# Patient Record
Sex: FEMALE | Race: WHITE
[De-identification: names, ages, dates, MRNs, and addresses within clinical notes are randomized per-mention and may not be internally consistent; named-entity substitution may affect disease eponyms.]

---

## 2019-05-30 PROBLEM — Z00.00 ENCOUNTER FOR PREVENTIVE HEALTH EXAMINATION: Status: ACTIVE | Noted: 2019-05-30

## 2019-06-03 ENCOUNTER — APPOINTMENT (OUTPATIENT)
Dept: FAMILY MEDICINE | Facility: CLINIC | Age: 39
End: 2019-06-03
Payer: COMMERCIAL

## 2019-06-03 VITALS
BODY MASS INDEX: 24.25 KG/M2 | WEIGHT: 160 LBS | OXYGEN SATURATION: 98 % | HEART RATE: 90 BPM | TEMPERATURE: 98.1 F | DIASTOLIC BLOOD PRESSURE: 84 MMHG | SYSTOLIC BLOOD PRESSURE: 116 MMHG | HEIGHT: 68 IN | RESPIRATION RATE: 16 BRPM

## 2019-06-03 DIAGNOSIS — F41.9 ANXIETY DISORDER, UNSPECIFIED: ICD-10-CM

## 2019-06-03 DIAGNOSIS — F32.9 ANXIETY DISORDER, UNSPECIFIED: ICD-10-CM

## 2019-06-03 DIAGNOSIS — R68.89 OTHER GENERAL SYMPTOMS AND SIGNS: ICD-10-CM

## 2019-06-03 DIAGNOSIS — Z83.3 FAMILY HISTORY OF DIABETES MELLITUS: ICD-10-CM

## 2019-06-03 DIAGNOSIS — F51.01 PRIMARY INSOMNIA: ICD-10-CM

## 2019-06-03 PROCEDURE — 99386 PREV VISIT NEW AGE 40-64: CPT

## 2019-06-03 RX ORDER — ETONOGESTREL AND ETHINYL ESTRADIOL .12; .015 MG/D; MG/D
0.12-0.015 INSERT, EXTENDED RELEASE VAGINAL
Qty: 1 | Refills: 2 | Status: ACTIVE | COMMUNITY
Start: 2019-06-03 | End: 1900-01-01

## 2019-06-03 RX ORDER — SERTRALINE HYDROCHLORIDE 100 MG/1
100 TABLET, FILM COATED ORAL DAILY
Qty: 45 | Refills: 3 | Status: ACTIVE | COMMUNITY
Start: 2019-06-03 | End: 1900-01-01

## 2019-06-03 RX ORDER — TRAZODONE HYDROCHLORIDE 50 MG/1
50 TABLET ORAL
Qty: 15 | Refills: 3 | Status: ACTIVE | COMMUNITY
Start: 2019-06-03 | End: 1900-01-01

## 2019-06-03 NOTE — HEALTH RISK ASSESSMENT
[Patient reported mammogram was normal] : Patient reported mammogram was normal [Patient reported PAP Smear was normal] : Patient reported PAP Smear was normal [None] : None [] : No [de-identified] : 3-4 x per week [de-identified] : Denies any drug use [MammogramDate] : 2018 [PapSmearDate] : 2018

## 2019-06-03 NOTE — HISTORY OF PRESENT ILLNESS
[de-identified] : 37 YO PMHx depression/anxiety here to establish care. Recently moved from [place] for work. No longer has any doctors. Has no current issues. Needs prescription for zoloft; has been off since January. Needs nuvaring. Needs trazodone as well. Also is looking to establish care with an OB/GYN in the area. \par Patient reports taking the following medications: Trazodone 25 QD , Phentermine 37.5 QD, zoloft 150 BID, Ativan 0.5 PRN, Nuvaring, Zyrtec

## 2019-06-03 NOTE — PLAN
[FreeTextEntry1] : 37YO F PMHx anxiety and depression presents as new patient to establish care\par \par Depression\par - Patient reports taking sertraline 150 BID; has not taken it since January-Feb?; reports having some pills\par - Lengthy discussion had with patient about the high dose of sertraline, recommended starting at a lower dose and titrating up if needed\par - Will restart at sertraline 150 QD\par - Prescription provided for trazodone 25\par \par Weight\par - Patient with normal BMI\par - No indication for phentermine as can worsen anxiety/depression and patient is a normal weight\par - Recommend diet and exercise; counselled on lifestyle modification \par - Can follow up with weight loss clinic \par \par Contraception\par - Prescription provided for NuvaRing x 3 months\par - Counselled on safe sex practices/STD prevention\par \par HCM\par - recommended bloodwork, patient refused at this time and will have recent bloodwork sent to office\par - Patient to provide medical documentation from previous PCP in [place] ; release of information signed\par - Follow up in

## 2019-06-03 NOTE — PHYSICAL EXAM
[No Acute Distress] : no acute distress [Well Nourished] : well nourished [Well Developed] : well developed [Well-Appearing] : well-appearing [Normal Sclera/Conjunctiva] : normal sclera/conjunctiva [PERRL] : pupils equal round and reactive to light [Normal Oropharynx] : the oropharynx was normal [EOMI] : extraocular movements intact [No JVD] : no jugular venous distention [No Respiratory Distress] : no respiratory distress  [Clear to Auscultation] : lungs were clear to auscultation bilaterally [Normal Rate] : normal rate  [No Accessory Muscle Use] : no accessory muscle use [Regular Rhythm] : with a regular rhythm [No Edema] : there was no peripheral edema [Normal S1, S2] : normal S1 and S2 [Soft] : abdomen soft [No Extremity Clubbing/Cyanosis] : no extremity clubbing/cyanosis [Non Tender] : non-tender [No Joint Swelling] : no joint swelling [No Rash] : no rash [Normal Gait] : normal gait [Coordination Grossly Intact] : coordination grossly intact

## 2019-09-27 ENCOUNTER — APPOINTMENT (OUTPATIENT)
Dept: SPINE | Facility: CLINIC | Age: 39
End: 2019-09-27

## 2019-10-04 ENCOUNTER — APPOINTMENT (OUTPATIENT)
Dept: SPINE | Facility: CLINIC | Age: 39
End: 2019-10-04
Payer: COMMERCIAL

## 2019-10-04 VITALS
HEIGHT: 68 IN | SYSTOLIC BLOOD PRESSURE: 126 MMHG | BODY MASS INDEX: 24.25 KG/M2 | DIASTOLIC BLOOD PRESSURE: 90 MMHG | WEIGHT: 160 LBS

## 2019-10-04 PROCEDURE — 99203 OFFICE O/P NEW LOW 30 MIN: CPT

## 2019-10-04 RX ORDER — OXYCODONE HYDROCHLORIDE 30 MG/1
TABLET ORAL
Refills: 0 | Status: ACTIVE | COMMUNITY

## 2019-10-04 NOTE — ASSESSMENT
[FreeTextEntry1] : Assess:\par  This is a pleasant 39 year old female who suffers from a long standing history of lower back and neck pain.  She reports no radiculopathy of any extremity.  She has undergone an extensive list of conservative measures including in the HPI.  At this point, a cervical and lumbar MRI will be ordered and she will return to the office after the images are obtained for review.  In the meantime she will continue to be monitored by pain management.    A spinal cord stimulator was discussed and entertained if the MRI' s reveal no significant  findings.  She was given a brochure on SCS and will be seen in one week in follow up.

## 2019-10-04 NOTE — REASON FOR VISIT
[New Patient Visit] : a new patient visit [Other: _____] : [unfilled] [FreeTextEntry1] : Neck and lower back pain

## 2019-10-04 NOTE — HISTORY OF PRESENT ILLNESS
[> 3 months] : more  than 3 months [FreeTextEntry1] : Back and neck pain without radiculopathy  [de-identified] : 39 year old female who recently moved from [facility] to [place] and now resides in [place].  She has a long standing history of neck and back pain .  She can recall at the age of 26 is when her neck and back pain began.  No injury or trauma has been connected to the pain.   The pain is intermittent and an 8/10.  She has trouble standing, sitting, bending and the inability to exercise when the pain attacks occur.   The neck pain is located on both sides of her neck, there is no radicular pain into her arms.  She has lower back pain primarily on the right side and buttock area.  However the pain does not radiate into the thighs, leg, or feet.  She reports no urinary or bowel symptoms.  \par \par In the past twenty years she has been followed in [facility] by a neurosurgeon and pain specialist.  She has never had neurosurgery and has undergone extensive conservative measures.  She has competed multiple treatments of physical therapy, epidural injections, acupuncture, massage, chiropractor therapy, heat therapy and CBD oil with initial relief and now any therapy has been refractory for her pain.  She walks without assistance.  She has never had a trial for a SCS.  Her last spinal MRI's are from six years ago.

## 2019-10-04 NOTE — PHYSICAL EXAM
[General Appearance - Alert] : alert [General Appearance - In No Acute Distress] : in no acute distress [General Appearance - Well Nourished] : well nourished [General Appearance - Well Developed] : well developed [Oriented To Time, Place, And Person] : oriented to person, place, and time [Impaired Insight] : insight and judgment were intact [Affect] : the affect was normal [Mood] : the mood was normal [Person] : oriented to person [Place] : oriented to place [Time] : oriented to time [Short Term Intact] : short term memory intact [Remote Intact] : remote memory intact [Registration Intact] : recent registration memory intact [Cranial Nerves Oculomotor (III)] : extraocular motion intact [Cranial Nerves Trigeminal (V)] : facial sensation intact symmetrically [Motor Tone] : muscle tone was normal in all four extremities [Motor Strength] : muscle strength was normal in all four extremities [Involuntary Movements] : no involuntary movements were seen [No Muscle Atrophy] : normal bulk in all four extremities [Balance] : balance was intact [Coordination - Dysmetria Impaired Finger-to-Nose Bilateral] : present bilaterally [No Visual Abnormalities] : no visible abnormalities [Sclera] : the sclera and conjunctiva were normal [Neck Appearance] : the appearance of the neck was normal [Outer Ear] : the ears and nose were normal in appearance [] : no respiratory distress [Skin Color & Pigmentation] : normal skin color and pigmentation [Abnormal Walk] : normal gait [Romberg's Sign] : Romberg's sign was negtive [Limited Balance] : balance was intact

## 2019-10-04 NOTE — REVIEW OF SYSTEMS
[Negative] : Heme/Lymph [de-identified] : neck and lower back pain  [FreeTextEntry1] : No urine or bowel symptoms

## 2019-10-11 ENCOUNTER — APPOINTMENT (OUTPATIENT)
Dept: MRI IMAGING | Facility: CLINIC | Age: 39
End: 2019-10-11

## 2019-10-15 ENCOUNTER — APPOINTMENT (OUTPATIENT)
Dept: SPINE | Facility: CLINIC | Age: 39
End: 2019-10-15
Payer: COMMERCIAL

## 2019-10-15 VITALS
DIASTOLIC BLOOD PRESSURE: 87 MMHG | SYSTOLIC BLOOD PRESSURE: 132 MMHG | HEIGHT: 68 IN | BODY MASS INDEX: 24.25 KG/M2 | WEIGHT: 160 LBS

## 2019-10-15 DIAGNOSIS — M54.2 CERVICALGIA: ICD-10-CM

## 2019-10-15 DIAGNOSIS — Z72.89 OTHER PROBLEMS RELATED TO LIFESTYLE: ICD-10-CM

## 2019-10-15 DIAGNOSIS — M54.5 LOW BACK PAIN: ICD-10-CM

## 2019-10-15 DIAGNOSIS — Z78.9 OTHER SPECIFIED HEALTH STATUS: ICD-10-CM

## 2019-10-15 PROCEDURE — 99212 OFFICE O/P EST SF 10 MIN: CPT

## 2019-10-15 NOTE — ASSESSMENT
[FreeTextEntry1] : Assess;\par Chronic pain of neck and lower spine \par No radiculopathy\par MRI shows only mild DJD\par Failed conservative therapy \par \par PLAN:\par Surgery not indicated \par Recommend Aqua therapy \par Follow up PRN

## 2019-10-15 NOTE — HISTORY OF PRESENT ILLNESS
[de-identified] : 39 year old white female who presents with a history of chronic neck and lower back pain.  She has no radiculopathy of all four extremities.  She has done numreous conservative measures without relief.  MRI of the neck and lumbar region will be reviewed.

## 2019-10-15 NOTE — REASON FOR VISIT
[New Patient Visit] : a new patient visit [Other: _____] : [unfilled] [FreeTextEntry1] : Chronic neck and back pain without radiculopathy